# Patient Record
Sex: MALE | Race: WHITE
[De-identification: names, ages, dates, MRNs, and addresses within clinical notes are randomized per-mention and may not be internally consistent; named-entity substitution may affect disease eponyms.]

---

## 2017-11-29 ENCOUNTER — HOSPITAL ENCOUNTER (OUTPATIENT)
Dept: HOSPITAL 17 - HCAT | Age: 62
Discharge: HOME | End: 2017-11-29
Attending: INTERNAL MEDICINE
Payer: COMMERCIAL

## 2017-11-29 VITALS
TEMPERATURE: 98.1 F | OXYGEN SATURATION: 99 % | SYSTOLIC BLOOD PRESSURE: 144 MMHG | DIASTOLIC BLOOD PRESSURE: 87 MMHG | RESPIRATION RATE: 18 BRPM | HEART RATE: 64 BPM

## 2017-11-29 VITALS — BODY MASS INDEX: 30.91 KG/M2 | WEIGHT: 203.93 LBS | HEIGHT: 68 IN

## 2017-11-29 DIAGNOSIS — E66.9: ICD-10-CM

## 2017-11-29 DIAGNOSIS — I10: ICD-10-CM

## 2017-11-29 DIAGNOSIS — E78.00: ICD-10-CM

## 2017-11-29 DIAGNOSIS — I25.110: Primary | ICD-10-CM

## 2017-11-29 LAB
ANION GAP SERPL CALC-SCNC: 3 MEQ/L (ref 5–15)
APTT BLD: 26.6 SEC (ref 24.3–30.1)
BASOPHILS # BLD AUTO: 0.1 TH/MM3 (ref 0–0.2)
BASOPHILS NFR BLD: 1 % (ref 0–2)
BUN SERPL-MCNC: 13 MG/DL (ref 7–18)
CHLORIDE SERPL-SCNC: 105 MEQ/L (ref 98–107)
EOSINOPHIL # BLD: 0.2 TH/MM3 (ref 0–0.4)
EOSINOPHIL NFR BLD: 3.1 % (ref 0–4)
ERYTHROCYTE [DISTWIDTH] IN BLOOD BY AUTOMATED COUNT: 13.2 % (ref 11.6–17.2)
GFR SERPLBLD BASED ON 1.73 SQ M-ARVRAT: 56 ML/MIN (ref 89–?)
HCO3 BLD-SCNC: 30.7 MEQ/L (ref 21–32)
HCT VFR BLD CALC: 44.5 % (ref 39–51)
HEMO FLAGS: (no result)
INR PPP: 1 RATIO
LYMPHOCYTES # BLD AUTO: 1.8 TH/MM3 (ref 1–4.8)
LYMPHOCYTES NFR BLD AUTO: 34.8 % (ref 9–44)
MCH RBC QN AUTO: 31.1 PG (ref 27–34)
MCHC RBC AUTO-ENTMCNC: 34.1 % (ref 32–36)
MCV RBC AUTO: 91.3 FL (ref 80–100)
MONOCYTES NFR BLD: 10.9 % (ref 0–8)
NEUTROPHILS # BLD AUTO: 2.6 TH/MM3 (ref 1.8–7.7)
NEUTROPHILS NFR BLD AUTO: 50.2 % (ref 16–70)
PLATELET # BLD: 164 TH/MM3 (ref 150–450)
POTASSIUM SERPL-SCNC: 3.4 MEQ/L (ref 3.5–5.1)
PROTHROMBIN TIME: 11.4 SEC (ref 9.8–11.6)
RBC # BLD AUTO: 4.88 MIL/MM3 (ref 4.5–5.9)
SODIUM SERPL-SCNC: 139 MEQ/L (ref 136–145)
WBC # BLD AUTO: 5.2 TH/MM3 (ref 4–11)

## 2017-11-29 PROCEDURE — 92928 PRQ TCAT PLMT NTRAC ST 1 LES: CPT

## 2017-11-29 PROCEDURE — 80048 BASIC METABOLIC PNL TOTAL CA: CPT

## 2017-11-29 PROCEDURE — 85730 THROMBOPLASTIN TIME PARTIAL: CPT

## 2017-11-29 PROCEDURE — C1893 INTRO/SHEATH, FIXED,NON-PEEL: HCPCS

## 2017-11-29 PROCEDURE — C1769 GUIDE WIRE: HCPCS

## 2017-11-29 PROCEDURE — 99153 MOD SED SAME PHYS/QHP EA: CPT

## 2017-11-29 PROCEDURE — 99152 MOD SED SAME PHYS/QHP 5/>YRS: CPT

## 2017-11-29 PROCEDURE — C1887 CATHETER, GUIDING: HCPCS

## 2017-11-29 PROCEDURE — 92978 ENDOLUMINL IVUS OCT C 1ST: CPT

## 2017-11-29 PROCEDURE — 85610 PROTHROMBIN TIME: CPT

## 2017-11-29 PROCEDURE — 93454 CORONARY ARTERY ANGIO S&I: CPT

## 2017-11-29 PROCEDURE — C1753 CATH, INTRAVAS ULTRASOUND: HCPCS

## 2017-11-29 PROCEDURE — 85025 COMPLETE CBC W/AUTO DIFF WBC: CPT

## 2017-11-29 PROCEDURE — 93005 ELECTROCARDIOGRAM TRACING: CPT

## 2017-11-29 PROCEDURE — C1874 STENT, COATED/COV W/DEL SYS: HCPCS

## 2017-11-29 PROCEDURE — G0269 OCCLUSIVE DEVICE IN VEIN ART: HCPCS

## 2017-11-29 PROCEDURE — C1760 CLOSURE DEV, VASC: HCPCS

## 2017-11-29 NOTE — MA
cc:

GM TERRELL M.D.

****

 

 

DATE:  11/29/2017

 

PROCEDURE PERFORMED

1. Coronary angiography.

2. Left internal mammary arteriography.

3. Intravascular ultrasound and drug-eluting stent implantation of the left

   internal mammary bypass graft.

 

BRIEF HISTORY

El Belrtán is a 62-year-old man with premature coronary artery disease.

He had bypass surgery at age 42.  By 2004 the vein graft to the right coronary

artery had closed.  He has had three separate stent procedures of the right

coronary artery.  He had two stent procedures of the vein graft to the diagonal

and obtuse marginal branch which was found to be completely closed on a cath in

January 2016.  He has had severe angina and has already been through the first

round of EECP ___________.  After the second round of  EECP he still had class

IV angina and for this reason cardiac catheterization was advised.

 

DESCRIPTION OF PROCEDURE

The patient was brought to the cardiac cath lab in a fasting state.  The right

groin was prepped and draped in sterile fashion.  Using 1% lidocaine for local

anesthesia a 6.5 Nauruan sheath was inserted in the right femoral artery

requiring only a single stick.  Coronary angiography was then completed using a

left 5 Mariia for the left coronary artery and a 3-D RC for the right coronary

artery.  Imaging of the left internal mammary bypass graft was then performed

using an RAY catheter.  He was found to have a severe stenosis in the proximal

portion of the graft with the appearance of a dissection.  Intravascular

ultrasound confirmed a severe stenosis with a flap dissection.  Intravenous

Angiomax was started.  I used a 6 Nauruan RAY guiding catheter to engage the

mammary.  I crossed the lesion with a Choice Floppy wire.  IVUS was performed.

I stented it with a 3.5 x 12 mm Resolute Ti stent at 15 atmospheres.  There

was some plaque at the end of the stent so I placed a second 3.5 x 8 mm

Resolute Stovall extent in overlapping fashion and deployed that also at 15

atmospheres.  Angiography demonstrates a good result.  The patient tolerated

the procedure well.  The guide was removed.  The sheath was removed.  An

Angio-Seal device was deployed with good hemostasis.  There were no

complications.

 

FINDINGS

 

HEMODYNAMICS

Aortic pressure is 112/70 with a mean of 89.

 

CORONARY ANGIOGRAPHY

The left main coronary artery is large and normal-appearing.

 

The left anterior descending artery is totally occluded after a couple of small

septals and a 50% small disease diagonal branch.

 

The circumflex artery has occlusion of the obtuse marginal branch.  The

circumflex is fairly large with irregularities and gives off two posterolateral

branches.  The first posterolateral branch has 20% disease.  The second

posterolateral branch has at least 30% disease.

 

The right coronary is a dominant vessel, has diffuse irregularities but no

significant stenosis is seen.  There may be about 20% proximal narrowing and

30% narrowing before the crux.

 

RESULTS OF INTERVENTION

Following stenting of the proximal left internal mammary graft the stenosis and

dissection has been completely relieved with no residual stenosis.

 

PLAN

The patient will be continued on aspirin and Plavix indefinitely.  Anticipate

discharge home this evening if stable.

 

 

 

                              _________________________________

                               MD RANDA Asif/JAZMIN

D:  11/29/2017/11:13 AM

T:  11/29/2017/11:23 AM

Visit #:  [id number]

Job #:  [id number]

## 2017-11-29 NOTE — CATHPROC
[employer] HIS Report

Study Information

Study Number    Admission           Scheduled Start             Study Start

 

[id number]    Nov 29 2017 7:41AM      11/29/2017 Nov 29 2017 9:34AM

 

Universal Service

 

Cardiac Catheterization

 

Admit Source               Facility Department

 

Other                   [facility] - Cath Lab

 

Physician and Clinical Staff

Initial Ghanshyam Yadav           Circulator     Duglas RN, Tyler

 

                         CircRosemarie Moss BSN

 

                         Recorder      Christal Granados,RT(R) (BS)

 

                         ScrKaterina Mcclelland,RT(R)

 

Procedures Performed

Procedure                     Location (Site)            Vessel Name

 

Coronary Angiograms                 LCA                 Left Coronary

Coronary Angiograms                 RCA                 Right Coronary

Coronary Angiograms                ROSADO-LAD               Left Coronary

Drug Eluting Inflatio               ROSADO-LAD               Left Coronary

IVUS                        Fem Art (right)           Femoral Art

L Heart Cath

Wire insertion                   Fem Art (right)           Femoral Art

Equipment

Time            Description           Size       Mfg Part Number  Used/Scraped

                   TRANSDUCER, VIVI               [id number]

09:34    [employer]                      *                 Used

                   W/STOCKCOCK                   *[id number]

                                            [id number]

10:29    [employer]    WIRE, CHOICE PT 182CM      182CM               Used

                                            *[id number]

                                            [id number]



                                            *[id number]

                                            [phone]



                                            *[id number]

                                            [id number]



                                            *[id number]

                                            [id number]



                                            *[id number]

                                            [id number]

11:00    [employer] ANGIOSEAL, FR6 VIP         FR 6                Used

                                            *[id number]

                                            [id number]

09:34    [employer]    PACK, CCL CUSTOM        *                 Used

                                            *[id number]

                                            [id number]

09:34    [employer]      PEN, SKIN DUAL W/ RULER     *                 Used

                                            *[id number]

                                            [id number]

10:47    [employer]        STENT, 3.5 12MM NADIR      3.5 12MM              Used

                                            *[id number]

                                            [id number]

10:58    [employer]        STENT, 3.5 8MM NADIR       3.5 8MM              Used

                                            *[id number]

                                            [id number]

10:48    [employer]      30 BREANA INDEFLATOR                         Used

                                            *[id number]

                                            [id number]

09:34    [employer]      SHEATH, FR6.5 PRELUDE 11CM   FR 6.5      038ACT      Used

                                            *[id number]

                                            [id number]

09:34    [employer]      WIRE, 3MMJ .035 180CM      180CM               Used

                                            *[id number]

                                            [id number]

09:34    [employer]          MANIFOLD, 4 PORT        *                 Used

                                            *[id number]

09:34    [employer]         OMNIPAQUE, 350 MG, 100ML    100ML      [id number]      Used

                                            [id number]

09:34    [employer]      BLANKET,WARM AIR CCL      *                 Used

                                            *[id number]

                   CATHETER, EAGLE EYE PLATINUM           [id number]

10:33    [employer]                                           Used

                   IMAGING                     *[id number]

 

Equipment Model, Serial, Lot Number and Expiration Data

Description              Model Number      Serial Number   Lot Number       Expiration Date

 

ANGIOSEAL, RHEA VIP                                [id number]        07-

 

STENT, 3.5 12MM NADIR         [id number]                [id number]       07-

 

History: Current Medications

Medication         Dosage/Unit       Route      Frequency  Last Date/Time Taken

 

Statins (any)

 

Beta Blocker

 

ASA

 

EFFIENT

 

 

History: Allergies

Allergy              Reaction

 

iodine

History: Risk Factors

                   Family History of

Hypertension   Dyslipidemia                 Previous MI    Previous Heart Failure

                   Premature CAD

Yes        Yes         No            No         No

Prior Valve

         Prior PCI      Prior CABG

Surgery

No        Yes         Yes

         Cerebrovascular   Peripheral Artery     Chronic Lung

On Dialysis                                    Diabetes

         Disease       Disease          Disease

No        No         No            No         No

 

 

History: Stress Tests

Stress or Imaging Studies Performed

 

No

 

 

History: Other

Current Smoker

 

No

 

Labs

Hgb (g/dl)       Hct (%)       WBC (l/cumm)        Platelets (thousands)

 

11.60-17.00      35.00-51.00     4.00-11.00         150..00

 

15.2          44.5        5.2             164

 

Glucose (mg/dl)    BUN (mg/dl)     Creatinine (mg/dl)    BUN:Creatinine (1:x)

74..00      7.00-18.00     0.50-1.30         10.00-20.00

 

114          13         1.3            10

 

Na (meq/l)       K (meq/l)

 

136..00     3.50-5.10

 

139          3.4

 

INR (PTT:PT)

 

0.90-1.10

 

1

 

CPK-MB (ng/ML)

 

0.50-3.60

 

Not Drawn

 

 

 

 

Medication

Medication Total Dose (Bolus/Oral)

Medication              Total Dosage/Unit

 

1% XYLOCAINE                 20 mL

 

ANGIOMAX BOLUS               13.9 mL

 

MORPHINE                   2 mg

 

NTG (IC)                  150 mcg

 

PLAVIX                   300 mg

 

POTASSIUM CHLORIDE              10 meq/hr

 

VERSED                    3 mg

 

Medications (Bolus/Oral)

Medication           Time Given           Dosage/Unit       Administered By       Reason

 

POTASSIUM CHLORIDE       11/29/2017 9:52:58 AM      10 meq/hr       Tyler Ardon RN

10 meq/hr POTASSIUM CHLORIDE given in lab by Tyler Ardon RN in Left Antecubital via Peripheral IV.

 

                11/29/2017 10:06:48

VERSED                             1 mg         Duglas OWENS, Tyler

                AM

1 mg VERSED given in lab by Duglas OWENS, Tyler in Left Antecubital via Peripheral IV.

 

                11/29/2017 10:08:40

VERSED                             1 mg         Duglas OWENS, Tyler

                AM

1 mg VERSED given in lab by Tyler Ardon RN in Left Antecubital via Peripheral IV.

 

                11/29/2017 10:09:10

1% XYLOCAINE                          20 mL         Duglas OWENS, Tyler

                AM

20 mL 1% XYLOCAINE given in lab by Tyler Ardon RN in Left Antecubital via Subcutaneous.

 

                11/29/2017 10:29:53

ANGIOMAX BOLUS                        13.9 mL         Duglas OWENS, Tyler

                AM

13.9 mL ANGIOMAX BOLUS given in lab by Tyler Ardon RN in Left Antecubital via Peripheral IV.

 

                11/29/2017 10:40:47

VERSED                             1 mg         Duglas OWENS, Tyler

                AM

1 mg VERSED given in lab by Tyler Ardon RN in Left Antecubital via Peripheral IV.

 

                11/29/2017 10:48:22

NTG (IC)                           150 mcg         Ghanshyam Worthington

                AM

150 mcg NTG (IC) given in lab by Ghanshyam Worthington via Intra-coronary.

 

                11/29/2017 11:06:22

PLAVIX                            300 mg         Tyler Ardon RN

                AM

300 mg PLAVIX given in lab by Tyler Ardon RN via Oral.

 

                11/29/2017 11:11:54

MORPHINE                            2 mg         Duglas OWENS, Tyler

                AM

2 mg MORPHINE given in lab by Tyler Ardon RN in Left Antecubital via Peripheral IV.

 

Medication (Drip)

Medication           Time Given           Dosage/Unit      Concentration/Unit  Diluent (ml)  Solution


                11/29/2017 10:33:52

ANGIOMAX DRIP                        1.755 mg/kg/hr         250 mg     50       NaCl .9

                AM

1.755 mg/kg/hr ANGIOMAX DRIP given in lab by Tyler Ardon RN in Left Antecubital via Peripheral IV. P
ump/Drip Flow = 32.4 ml/hr using NaCl .9 with

a concentration of 250 mg in 50 ml.

ANGIOMAX DRIP         11/29/2017 11:02:07

                                0 units/hr                 0

STOPPED            AM

0 units/hr ANGIOMAX DRIP STOPPED given by Tyler Ardon RN. Pump/Drip Flow = 0 ml/hr using [Solution N
hao].

 

IV Solutions          11/29/2017 9:45:33 AM      0 mL (IV)                 500       NaCl .9

Patient arrived on IV Solutions in Left Antecubital via Peripheral IV. Pump/Drip Flow = 30 ml/hr usin
g NaCl .9.

Initial Case Assessment

Cardiovascular

HR           Rhythm          NIBP          Chest Pain

 

86           reg            133/86         0

 

Edema Present      Skin color           Skin

 

None           Normal             Warm Dry

 

Circulatory - Right Pulses

Dorsalis Pedis     Femoral

 

2            2

 

Scale (0,1,2,3,4,d)

 

Circulatory - Left Pulses

Dorsalis Pedis     Femoral

 

2            2

 

Scale (0,1,2,3,4,d)

 

Circulatory - Lower Extremities

Color Lower Right    Color Lower Left

 

 

Normal         Normal

 

Neurological State

            Oriented to time-place-

Alert                      Moves all extremities

            person

 

Respiration - General

Respiration Rate

            SpO2 (%)

(B/min)

15           98

 

Chronological Log

Time    Study Chronological Log

 

9:42:07   Patient arrived via Bed.

 

9:42:11   Patient Name, D.O.B, / Armband Verified By R.N.

 

9:42:13   Consent signed by the physician and the patient and verified by the Cath Lab staff.

 

9:43:23   Pt was premedicated for iodine allergy in DOCU prior to transport to cath lab

 

9:45:17   Pre-op and post- op instructions given; patient acknowledges understanding of instructions.


 

9:45:18   Verbal Stimulation=2 Physical Stimulation=2 Airway=2 Respiration=2 TOTAL=8. (0=absent, 1=li
mited, 2=present)

 

9:45:19   Presedation assessment performed by Cath Lab RN.

 

9:45:27   Patient has been NPO for More than 6Hrs.

 

9:45:28   Skin Breakdown none per pt

 

9:45:28   Patient Warmer Placed on the Table.

 

9:45:32   Marta Prominences Protected

 

9:45:33   A # 20 IV was noted in the Antecubital (left). Grade = 0

 

9:45:33   Patient arrived on IV Solutions in Left Antecubital via Peripheral IV. Pump/Drip Flow = 30 
ml/hr using NaCl .9.

 

9:45:34   History and physical on the chart or being dictated.

      Assessment: Initial Case, HR=86 BPM, Rhythm=reg, RHDF=195/86 mmhg, Chest Pain=0, Edema=None, Co
preethi=Normal,

      Skin = Warm, Dry

      Right Pulses: Terrence Ped=2, Femoral=2

      Left Pulses: Terrence Ped=2, Femoral=2

9:45:36

      Lower Right Extremities: Color=Normal

      Lower Left Extremities: Color=Normal

      Neurological: State=Alert, Ox3, LACY

      Respiration: Resp=15 B/min, SpO2=98 %

      Vitals capture started with the following parameters, Patient=Adult, Interval=5 min, Initial Pr
kvmcqp=522 mmHg,

9:49:19

      Deflation Rate=5 mmHg, Cuff placed on Left Arm

9:50:03  HR=82 bpm, DBZZ=222/86 mmhg, SpO2=99.0 %, Resp=16 B/min, Pain=0, Glendy=10, Bosch=2

 

9:52:42  Bilateral groins prepped with 2% chlorhexidine, and draped after a 3 minute waiting time.

 

9:52:58  10 meq/hr POTASSIUM CHLORIDE given in lab by Tyler Ardon RN in Left Antecubital via Periphe
ral IV.

 

9:54:58  HR=88 bpm, NZXZ=069/84 mmhg, SpO2=99.0 %, Resp=11 B/min, Pain=0, Glendy=10, Bosch=2

 

9:56:33  Reference ECG taken

 

9:56:54  Pressure channel 1 zeroed.

 

9:57:07  MD paged

 

10:00:34  MD arrived

 

10:00:38  HR=73 bpm, MFSQ=285/51 mmhg, SpO2=94.0 %, Resp=12 B/min, Pain=0, Glendy=10, Bosch=2

      Time Out. Correct patient, correct procedure, correct physician, power injector not loaded with
 contrast with surgical

10:05:04

      team present. Time Out Concurred by MD and individual staff in procedure.

10:05:15  Case Start

 

10:05:19  HR=74 bpm, SWIM=443/89 mmhg, SpO2=97.0 %, Resp=17 B/min, Pain=0, Glendy=10, Bosch=2

 

10:06:48  1 mg VERSED given in lab by Tyler Ardon RN in Left Antecubital via Peripheral IV.

 

10:08:40  1 mg VERSED given in lab by Tyler Ardon RN in Left Antecubital via Peripheral IV.

 

10:09:10  20 mL 1% XYLOCAINE given in lab by Tyler Ardon RN in Left Antecubital via Subcutaneous.

 

10:09:57  HR=75 bpm, NQAK=667/80 mmhg, SpO2=95.0 %, Resp=15 B/min, Pain=0, Glendy=10, Bosch=2

 

10:10:26  Access site was Right Femoral Artery.

 

10:10:45  A SHEATH, FR6.5 PRELUDE 11CM FR 6.5 was advanced into the Fem Art (right) using the Percuta
neous technique.

      A JL 5.0 INFINITI CATHETER FR 6 was advanced over a wire. OMNIPAQUE, 350 MG, 100ML 100ML was us
ed for

10:11:39

      injections.

      Recorded Pressure: Ao, HR=73, Condition=Condition 1

10:13:04

      (Aorta) Ao 112/70/89

10:13:43  The  LCA was injected and visualized at various angles. OMNIPAQUE, 350 MG, 100ML 100ML used
.

 

10:14:58  HR=74 bpm, RFVY=055/79 mmhg, SpO2=96.0 %, Resp=15 B/min, Pain=0, Glendy=10, Bosch=2

 

10:15:08  Catheter was removed

      A 3DRC INFINITI CATHETER FR 6 was advanced over a wire. OMNIPAQUE, 350 MG, 100ML 100ML was used
 for

10:15:10

      injections.

10:17:14  The  RCA was injected and visualized at various angles. OMNIPAQUE, 350 MG, 100ML 100ML used
.

 

10:19:13  Catheter was removed

      A RAY INFINITI CATHETER FR 6 was advanced over a wire. OMNIPAQUE, 350 MG, 100ML 100ML was used 
for

10:19:14

      injections.

10:19:55  HR=77 bpm, OHEK=488/78 mmhg, SpO2=94.0 %, Resp=18 B/min, Pain=0, Glendy=10, Bosch=2

 

10:22:40  The LIMA-LAD was injected and visualized at various angles. OMNIPAQUE, 350 MG, 100ML 100ML 
used.

 

10:25:00  HR=76 bpm, QLLZ=001/78 mmhg, SpO2=95.0 %, Resp=12 B/min, Pain=0, Glendy=10, Bosch=2

 

10:26:37  Catheter was removed

 

10:29:53  HR=75 bpm, OWDH=825/90 mmhg, SpO2=89.0 %, Resp=11 B/min, Pain=0, Lgendy=10, Bosch=2

10:29:53  13.9 mL ANGIOMAX BOLUS given in lab by Tyler Ardon RN in Left Antecubital via Peripheral I
V.

 

10:30:43  A RAY GUIDE CATHETER FR 6 was advanced over a wire. OMNIPAQUE, 350 MG, 100ML 100ML was used
 for injections.

      1.755 mg/kg/hr ANGIOMAX DRIP given in lab by Tyler Ardon RN in Left Antecubital via Peripheral
 IV. Pump/Drip Flow =

10:33:52

      32.4 ml/hr using NaCl .9 with a concentration of 250 mg in 50 ml.

10:34:24  A WIRE, 3MMJ .035 180CM 180CM was inserted via Fem Art (right).

 

10:35:00  HR=72 bpm, JODA=552/76 mmhg, SpO2=94.0 %, Resp=21 B/min, Pain=0, Glendy=10, Bosch=2

 

10:37:29  A WIRE, CHOICE PT 182CM 182CM was inserted via Fem Art (right).

 

10:39:11  An CATHETER, EAGLE EYE PLATINUM IMAGING was advanced through the lesion.       Images saved
 onto IVUS hard drive

 

10:39:57  HR=71 bpm, JNHJ=885/79 mmhg, SpO2=92.0 %, Resp=22 B/min, Pain=0, Glendy=10, Bosch=2

 

10:40:47  1 mg VERSED given in lab by Tyler Ardon RN in Left Antecubital via Peripheral IV.

 

10:44:04  IVUS catheter removed

 

10:45:01  HR=74 bpm, YVMD=248/76 mmhg, SpO2=90.0 %, Resp=20 B/min, Pain=0, Glendy=10, Bosch=2

      A STENT, 3.5 12MM NADIR 3.5 12MM was advanced through a RAY GUIDE CATHETER FR 6 over a WIRE, CHO
ICE PT

10:46:08

      182CM 182CM.

      A STENT, 3.5 12MM NADIR 3.5 12MM was deployed using a 30 BREANA INDEFLATOR at 16 atmospheres for 17
 seconds in

10:47:32

      the LIMA-LAD.

10:48:22  150 mcg NTG (IC) given in lab by Ghanshyam Worthington via Intra-coronary.

 

10:49:21  Delivery device removed

 

10:49:58  HR=77 bpm, IPPF=273/78 mmhg, SpO2=88.0 %, Resp=25 B/min, Pain=0, Glendy=10, Bosch=2

 

10:50:33  An CATHETER, EAGLE EYE PLATINUM IMAGING was advanced through the lesion.       Images saved
 onto IVUS hard drive

 

10:54:49  IVUS catheter removed

 

10:55:01  HR=76 bpm, ZOIF=481/78 mmhg, SpO2=91.0 %, Resp=22 B/min, Pain=0, Glendy=10, Bosch=2

      A STENT, 3.5 8MM NADIR 3.5 8MM was advanced through a RAY GUIDE CATHETER FR 6 over a WIRE, CHOIC
E PT

10:55:28

      182CM 182CM.

      A STENT, 3.5 8MM NADIR 3.5 8MM was deployed using a 30 BREANA INDEFLATOR at 15 atmospheres for 20 s
econds in the

10:56:08

      LIMA-LAD.

10:57:47  Delivery device removed

 

10:57:53  Catheter was removed

 

10:57:57  Wire removed

 

10:59:15  An injection in the Fem Art (right) was made through the SHEATH, FR6.5 PRELUDE 11CM FR 6.5.


 

10:59:58  HR=83 bpm, NOJL=824/81 mmhg, SpO2=92.0 %, Resp=24 B/min, Pain=0, Glendy=10, Bosch=2

 

11:02:07  0 units/hr ANGIOMAX DRIP STOPPED given by Tyler Ardon RN. Pump/Drip Flow = 0 ml/hr using [
Solution Name].

 

11:04:33  Case End

 

11:05:01  HR=71 bpm, ZMNY=460/65 mmhg, SpO2=92.0 %, Resp=20 B/min, Pain=0, Glendy=10, Bosch=2

 

11:06:21  Catheter(s) removed without difficulty

 

11:06:22  300 mg PLAVIX given in lab by Tyler Ardon RN via Oral.

 

11:06:25  Sterile dressing applied to site

 

11:06:26  No case complications noted.

 

11:06:32  A Left Heart Cath was performed.

 

11:06:56  DOCU called. Spoke to Judah.

 

11:09:58  HR=69 bpm, JPRP=086/75 mmhg, SpO2=91.0 %, Resp=14 B/min, Pain=0, Glendy=10, Bosch=2

 

11:11:54  2 mg MORPHINE given in lab by Tyler Ardon RN in Left Antecubital via Peripheral IV.

 

11:12:53  Vitals capture stopped.

11:13:46   Patient moved to [facility]

 

 

 

 

End Study - Contrast Media Used In Study

Contrast       Total Opened (mL)  Total Used (mL)     Total Wasted (mL)

 

Omnipaque       100         100           0

 

End Study - Maximum Contrast Load

Max Contrast Load (mL)

 

355.1

 

End Study - Radiation Exposure

Fluoro Time

(minutes)

13.5

 

 

End Study - Patient Disposition

Complications     Transferred To       Interventional Outcome

 

No          [facility]      successful

## 2017-11-29 NOTE — EKG
Date Performed: 11/29/2017       Time Performed: 08:24:12

 

PTAGE:      62 years

 

EKG:      Sinus rhythm 

 

 Leftward axis Right bundle branch block Lateral ST-T changes are nonspecific Abnormal ECG Compared t
o prior electrocardiogram, Right bundle branch block is present. 

 

 PREVIOUS TRACING            : 08/04/2010 09.13

 

DOCTOR:   Oral Forbes  Interpretating Date/Time  11/29/2017 13:28:02

## 2018-05-16 ENCOUNTER — HOSPITAL ENCOUNTER (OUTPATIENT)
Dept: HOSPITAL 17 - HCAT | Age: 63
Discharge: HOME | End: 2018-05-16
Attending: INTERNAL MEDICINE
Payer: COMMERCIAL

## 2018-05-16 VITALS
DIASTOLIC BLOOD PRESSURE: 88 MMHG | SYSTOLIC BLOOD PRESSURE: 167 MMHG | HEART RATE: 64 BPM | OXYGEN SATURATION: 98 % | RESPIRATION RATE: 18 BRPM | TEMPERATURE: 97.8 F

## 2018-05-16 DIAGNOSIS — Z79.82: ICD-10-CM

## 2018-05-16 DIAGNOSIS — E78.00: ICD-10-CM

## 2018-05-16 DIAGNOSIS — Z79.01: ICD-10-CM

## 2018-05-16 DIAGNOSIS — I25.10: Primary | ICD-10-CM

## 2018-05-16 DIAGNOSIS — I45.10: ICD-10-CM

## 2018-05-16 DIAGNOSIS — I10: ICD-10-CM

## 2018-05-16 LAB
BASOPHILS # BLD AUTO: 0.1 TH/MM3 (ref 0–0.2)
BASOPHILS NFR BLD: 1.3 % (ref 0–2)
BUN SERPL-MCNC: 14 MG/DL (ref 7–18)
CALCIUM SERPL-MCNC: 8.5 MG/DL (ref 8.5–10.1)
CHLORIDE SERPL-SCNC: 107 MEQ/L (ref 98–107)
CREAT SERPL-MCNC: 1.23 MG/DL (ref 0.6–1.3)
EOSINOPHIL # BLD: 0.2 TH/MM3 (ref 0–0.4)
EOSINOPHIL NFR BLD: 3.8 % (ref 0–4)
ERYTHROCYTE [DISTWIDTH] IN BLOOD BY AUTOMATED COUNT: 13.6 % (ref 11.6–17.2)
GFR SERPLBLD BASED ON 1.73 SQ M-ARVRAT: 59 ML/MIN (ref 89–?)
GLUCOSE SERPL-MCNC: 106 MG/DL (ref 74–106)
HCO3 BLD-SCNC: 29.4 MEQ/L (ref 21–32)
HCT VFR BLD CALC: 43.3 % (ref 39–51)
HGB BLD-MCNC: 14.5 GM/DL (ref 13–17)
LYMPHOCYTES # BLD AUTO: 1.6 TH/MM3 (ref 1–4.8)
LYMPHOCYTES NFR BLD AUTO: 35.1 % (ref 9–44)
MCH RBC QN AUTO: 30.1 PG (ref 27–34)
MCHC RBC AUTO-ENTMCNC: 33.4 % (ref 32–36)
MCV RBC AUTO: 90.2 FL (ref 80–100)
MONOCYTE #: 0.5 TH/MM3 (ref 0–0.9)
MONOCYTES NFR BLD: 10 % (ref 0–8)
NEUTROPHILS # BLD AUTO: 2.3 TH/MM3 (ref 1.8–7.7)
NEUTROPHILS NFR BLD AUTO: 49.8 % (ref 16–70)
PLATELET # BLD: 165 TH/MM3 (ref 150–450)
PMV BLD AUTO: 8.1 FL (ref 7–11)
RBC # BLD AUTO: 4.81 MIL/MM3 (ref 4.5–5.9)
SODIUM SERPL-SCNC: 144 MEQ/L (ref 136–145)
WBC # BLD AUTO: 4.6 TH/MM3 (ref 4–11)

## 2018-05-16 PROCEDURE — 99152 MOD SED SAME PHYS/QHP 5/>YRS: CPT

## 2018-05-16 PROCEDURE — 99153 MOD SED SAME PHYS/QHP EA: CPT

## 2018-05-16 PROCEDURE — 93005 ELECTROCARDIOGRAM TRACING: CPT

## 2018-05-16 PROCEDURE — 85025 COMPLETE CBC W/AUTO DIFF WBC: CPT

## 2018-05-16 PROCEDURE — 80048 BASIC METABOLIC PNL TOTAL CA: CPT

## 2018-05-16 PROCEDURE — 85730 THROMBOPLASTIN TIME PARTIAL: CPT

## 2018-05-16 PROCEDURE — C1769 GUIDE WIRE: HCPCS

## 2018-05-16 PROCEDURE — 93454 CORONARY ARTERY ANGIO S&I: CPT

## 2018-05-16 PROCEDURE — C1893 INTRO/SHEATH, FIXED,NON-PEEL: HCPCS

## 2018-05-16 NOTE — MA
cc:

Ghanshyam Worthington MD, Matthew MD

****

 

 

DATE:

05/16/2018

 

PROCEDURES PERFORMED:

Coronary angiography, left internal mammary bypass arteriography.

 

BRIEF HISTORY:

El Beltrán is a 63-year-old man who has had multiple 

revascularization procedures including bypass surgery and multiple 

stents.  His last procedure, he had a ruptured plaque and dissection 

in his proximal left main coronary artery that required stenting.  He 

continues to have severe angina and underwent a recent nuclear stress 

test that was very abnormal and for this reason, cardiac 

catheterization was advised.

 

DESCRIPTION OF PROCEDURE:

The patient was brought to the cardiac catheterization lab in a 

fasting state.  Using 1% lidocaine for local anesthesia, a 6-1/2 

Romansh sheath was easily inserted into the right femoral artery.  

Coronary angiography was then completed using a left 5-Mariia for the

left coronary artery and a 3DRC for the right coronary artery.  

Angiography of the internal mammary bypass was performed using an RAY 

catheter.  I studied these films and elected medical management.  The 

sheath is being pulled manually.

 

FINDINGS:

 

HEMODYNAMICS:

1.  Aortic pressure is 166/88 with a mean of 121.

2.  Coronary angiography:  The left main coronary artery was large and

normal.  It bifurcates into the LAD and circumflex vessels.  The LAD 

is totally occluded after a small septal and a small diagonal.  The 

diagonal branch is less than 2 mm and diffusely diseased with 90% 

proximal stenosis.  I thought this was too small to really intervene 

upon safely.  The circumflex artery has occlusion of the major obtuse 

marginal branch.  The distal circumflex after a small posterolateral 

branch has about 30% disease.  The posterolateral branch has about 25%

disease.  The right coronary artery is dominant.  The mid vessel has 

about 25% disease in between the previous stents.

3.  Bypass grafts:  The left internal mammary graft to the mid LAD is 

widely patent.  The stents in the proximal portion of the mammary are 

widely patent.  There may be about 10 to 20% stenosis before the 

stents.  The remaining vein grafts he has are noted to be totally 

occluded.

 

CONCLUSIONS:

1.  Stable appearance of the previous left internal mammary artery 

stenting to the LAD.

2.  Stable right coronary artery from previous procedures.

3.  Mild disease of the distal circumflex best treated medically.

4.  Severe disease of the diagonal branch that is less than 2 mm in 

size.  We will continue medical management.

 

 

 

__________________________________

MD RANDA Asif/SHELBY

D: 05/16/2018, 11:42 AM

T: 05/16/2018, 12:03 PM

Visit #: [id number]

Job #: [id number]

## 2018-05-16 NOTE — CATHPROC
[employer] HIS Report

Study Information

Study Number     Admission           Scheduled Start               Study Start

 

[id number]     May 16 2018 6:41AM      05/16/2018                  May 16 2018 10:40AM

 

Universal Service

 

Cardiac Catheterization

 

Admit Source                Facility Department

 

Emergency department            [facility] - Cath Lab

 

Physician and Clinical Staff

Initial Ghanshyam Yadav          Circulator       Katerina Randall,RT(R)

 

                          Recorder        Estefanía Mcdowell ,RT(R)

 

                          Scrub          Lizbeth Vazquez RN

 

Procedures Performed

Procedure                     Location (Site)             Vessel Name

 

Coronary Angiograms                 LCA                  Left Coronary

Coronary Angiograms                 RCA                  Right Coronary

Coronary Angiograms                 ROSADO-LAD                Left Coronary

L Heart Cath

 

 

 

Equipment

Time           Description            Size           Mfg Part Number  Used/Scraped

                   TRANSDUCER, TRUWAVE                    [id number]

10:53    SEGOVIA LAWSON                      *                     Used

                   W/STOCKCOCK                        *[id number]

                                                [phone]



                                                *[id number]

                                                [phone]



                                                *[id number]

                                                [phone]



                                                *[id number]

                                                [phone]



                                                *[id number]

                                                [id number]

10:53    [employer]   PACK, CCL CUSTOM         *                     Used

                                                *[id number]

                                                [id number]

10:53    [employer]      PEN, SKIN DUAL W/ RULER      *                     Used

                                                *[id number]

                                                [id number]

10:53    [employer]      SHEATH, FR6.5 PRELUDE 11CM    FR 6.5          038ACT      Used

                                                *[id number]

                                                [id number]

10:53    [employer]      WIRE, 3MMJ .035 180CM       180CM                   Used

                                                *[id number]

                                                [id number]

10:53    [employer]          MANIFOLD, 4 PORT         *                     Used

                                                *[id number]

10:53    [employer]         OMNIPAQUE, 350 MG, 150ML     150ML          [id number]      Used

                                                [id number]

10:53    [facility]      BLANKET,WARM AIR CCL       *                     Used

                                                *[id number]

History: Current Medications

Medication         Dosage/Unit       Route         Frequency      Last Date/Time Taken

 

Statins (any)

 

Beta Blocker

 

NORVASC

 

ASA

 

PLAVIX

 

NTG Patch

 

 

History: Allergies

Allergy               Reaction

 

No Known Allergies

 

iodine

 

 

History: Risk Factors

Hypertension    Dyslipidemia      Previous MI     Previous Heart Failure

 

Yes         Yes           No         No

Prior Valve

          Prior PCI        Prior PCIDate      Prior CABG     Prior CABGDate

Surgery

No         Yes           11/29/2017        Yes         09/01/1997

          Cerebrovascular     Peripheral Artery    Chronic Lung

On Dialysis                                       Diabetes

          Disease         Disease         Disease

No         No           No            No         No

 

 

History: Stress Tests

Stress or Imaging Studies Performed

 

Yes

Standard Exercise Stress

Test

No

 

Stress Echo

 

No

 

Stress Test SPECT        Stress Test SPECT Result     Stress Test SPECT Ischemia Risk/Extent

 

Yes               Positive             High

 

Stress Test CMR

 

No

 

Cardiac CTA           Coronary Calcium Score

 

No               No

 

 

History: Other

Current Smoker

 

No

 

Labs

Hgb (g/dl)        Hct (%)          WBC (l/cumm)         Platelets (thousands)

 

11.60-17.00       35.00-51.00        4.00-11.00          150..00

 

14.5           43.3           4.6             165

 

Glucose (mg/dl)     BUN (mg/dl)        Creatinine (mg/dl)    BUN:Creatinine (1:x)

74..00       7.00-18.00        0.50-1.30         10.00-20.00

 

106           14            1.2            11.7

 

Na (meq/l)        K (meq/l)

 

136..00      3.50-5.10

 

144           3.7

 

CPK-MB (ng/ML)

 

0.50-3.60

 

Not Drawn

 

 

 

 

Medication

Medication Total Dose (Bolus/Oral)

Medication              Total Dosage/Unit

 

1% XYLOCAINE                 10 mL

 

BENADRYL                   50 mg

 

SOLU-MEDROL                 50 mg

 

VERSED                    2 mg

 

Medications (Bolus/Oral)

Medication           Time Given          Dosage/Unit       Administered By      Reason

 

BENADRYL            5/16/2018 11:00:11 AM      50 mg         Hesher, Lizbeth

50 mg BENADRYL given in lab by Lizbeth Vazquez, RN via Peripheral IV. Ordered by Ghanshyam Worthington.

 

SOLU-MEDROL          5/16/2018 11:01:18 AM      50 mg         Hesher, Lizbeth

50 mg SOLU-MEDROL given in lab by Lizbeth Vazquez, RN via Peripheral IV. Ordered by Ghanshyam Worthington.

 

VERSED             5/16/2018 11:13:35 AM      2 mg         Hesher, Lizbeth

2 mg VERSED given in lab by Lizbeth Vazquez, RN in Left Antecubital via Peripheral IV. Ordered by Ghanshyam Ramirez.

 

1% XYLOCAINE          5/16/2018 11:14:23 AM      10 mL         Ghanshyam Worthington

10 mL 1% XYLOCAINE given in lab by Ghanshyam Worthington in Right Groin via Subcutaneous. Ordered by Ghanshyam Worthington.

 

Medication (Drip)

Medication           Time Given          Dosage/Unit       Concentration/Unit  Diluent (ml)  Solution


 

IV Solutions          5/16/2018 10:49:08 AM      50 mL (IV)                         NaCl .9

Patient arrived on IV Solutions in Left Antecubital via Peripheral IV. Pump/Drip Flow using NaCl .9.

Initial Case Assessment

Cardiovascular

HR          Rhythm         NIBP          Chest Pain

 

70          sr           159/107         0

 

Edema Present     Skin color           Skin

 

None         Normal             Warm Dry

 

Circulatory - Right Pulses

Dorsalis Pedis    Femoral

 

2           2

 

Scale (0,1,2,3,4,d)

 

Circulatory - Left Pulses

Dorsalis Pedis    Femoral

 

2           2

 

Scale (0,1,2,3,4,d)

 

Neurological State

           Oriented to time-place-

Alert                     Moves all extremities

           person

 

Respiration - General

Respiration Rate

           SpO2 (%)

(B/min)

28          99

Final Case Assessment

Cardiovascular

HR           Rhythm          NIBP           Chest Pain

 

70           sr            159/107         0

 

Edema Present      Skin color           Skin

 

None           Normal             Warm Dry

 

Circulatory - Right Pulses

Dorsalis Pedis     Femoral

 

2            2

 

Scale (0,1,2,3,4,d)

 

Circulatory - Left Pulses

Dorsalis Pedis     Femoral

 

2            2

 

Scale (0,1,2,3,4,d)

 

Neurological State

            Oriented to time-place-

Alert                      Moves all extremities

            person

 

Respiration - General

Respiration Rate

            SpO2 (%)

(B/min)

28           99

 

Chronological Log

Time    Study Chronological Log

 

10:44:57  Patient arrived via Bed.

 

10:44:58  Patient Name, D.O.B, / Armband Verified By R.N.

 

10:44:59  Consent signed by the physician and the patient and verified by the Cath Lab staff.

 

10:45:00  Pre-op and post- op instructions given; patient acknowledges understanding of instructions.


      Vitals capture started with the following parameters, Patient=Adult, Interval=5 min, Initial Pr
yufflu=294 mmHg,

10:48:29

      Deflation Rate=5 mmHg, Cuff placed on Left Arm

10:48:58  Verbal Stimulation=2 Physical Stimulation=2 Airway=2 Respiration=2 TOTAL=8. (0=absent, 1=li
mited, 2=present)

 

10:49:01  Patient has been NPO for More than 6Hrs.

 

10:49:02  Skin Breakdown- none per patient

 

10:49:04  Patient Warmer Placed on the Table.

 

10:49:05  HR=74 bpm, DITQ=545/107 mmhg, SpO2=99.0 %, Resp=11 B/min, Pain=0, Glendy=10, Bosch=2

 

10:49:07  Marta Prominences Protected

 

10:49:08  A # 20 IV was noted in the Antecubital (left). Grade = 0

 

10:49:08  Patient arrived on IV Solutions in Left Antecubital via Peripheral IV. Pump/Drip Flow using
 NaCl .9.

 

10:49:09  History and physical on the chart or being dictated.

      Assessment: Initial Case, HR=70 BPM, Rhythm=sr, OBXM=446/107 mmhg, Chest Pain=0, Edema=None, Co
preethi=Normal,

      Skin = Warm, Dry

      Right Pulses: Terrence Ped=2, Femoral=2

10:49:10

      Left Pulses: Terrence Ped=2, Femoral=2

      Neurological: State=Alert, Ox3, LACY

      Respiration: Resp=28 B/min, SpO2=99 %

10:54:30  Bilateral groins prepped with 2% chlorhexidine, and draped after a 3 minute waiting time.

 

10:54:56  HR=71 bpm, WJNA=362/115 mmhg, JbK4=581.0 %, Resp=13 B/min, Pain=0, Glendy=10, Bosch=2

 

10:55:28  Reference ECG taken

 

10:55:42  Reference ECG taken

 

10:58:13  Pressure channel 1 zeroed.

 

10:58:35  MD paged

 

10:59:20  HR=68 bpm, FAXG=147/57 mmhg, SpO2=98.0 %, Resp=22 B/min, Pain=0, Glendy=10, Bosch=2

 

11:00:11  50 mg BENADRYL given in lab by Lizbeth Vazquez, RN via Peripheral IV. Ordered by Guido Worthington

 

11:01:18  50 mg SOLU-MEDROL given in lab by Lizbeth Vazquez RN via Peripheral IV. Ordered by Ghanshyam Worthington.

 

11:02:48  MD arrived.

 

11:04:37  HR=68 bpm, SLLM=947/97 mmhg, SpO2=99.0 %, Resp=13 B/min, Pain=0, Glendy=10, Bosch=2

 

11:09:12  HR=66 bpm, CCER=907/105 mmhg, SpO2=96.0 %, Resp=25 B/min, Pain=0, Glendy=10, Bosch=2

      Time Out. Correct patient, correct procedure, correct physician, power injector not loaded with
 contrast with surgical

11:12:13

      team present. Time Out Concurred by MD and individual staff in procedure.

11:12:32  Case Start

 

11:13:35  2 mg VERSED given in lab by Lizbeth Vazquez, RN in Left Antecubital via Peripheral IV. Order
ed by Ghanshyam Worthington.

 

11:14:11  HR=71 bpm, REHQ=702/102 mmhg, BcB1=583.0 %, Resp=15 B/min, Pain=0, Glendy=10, Bosch=2

 

11:14:23  10 mL 1% XYLOCAINE given in lab by Ghanshyam Worthington in Right Groin via Subcutaneous. Ordered b
y Ghanshyam Worthington.

 

11:15:36  Access site was Right Femoral Artery.

 

11:15:48  A SHEATH, FR6.5 PRELUDE 11CM FR 6.5 was advanced into the Fem Art (right) using the Percuta
neous technique.

      A JL 5.0 INFINITI CATHETER FR 6 was advanced over a wire. OMNIPAQUE, 350 MG, 150ML 150ML was us
ed for

11:16:44

      injections.

      Recorded Pressure: Ao, HR=68, Condition=Condition 1

11:18:04

      (Aorta) Ao 166/88/121

11:18:15  The  LCA was injected and visualized at various angles. OMNIPAQUE, 350 MG, 150ML 150ML used
.

 

11:19:10  HR=69 bpm, VAHZ=824/91 mmhg, SpO2=95.0 %, Resp=23 B/min, Pain=0, Glendy=10, Bosch=2

 

11:19:33  Catheter was removed

      A 3DRC INFINITI CATHETER FR 6 was advanced over a wire. OMNIPAQUE, 350 MG, 150ML 150ML was used
 for

11:20:40

      injections.

11:21:08  The  RCA was injected and visualized at various angles. OMNIPAQUE, 350 MG, 150ML 150ML used
.

 

11:21:59  Catheter was removed

      A RAY INFINITI CATHETER FR 6 was advanced over a wire. OMNIPAQUE, 350 MG, 150ML 150ML was used 
for

11:23:13

      injections.

11:24:13  HR=73 bpm, YKAB=107/102 mmhg, SpO2=98.0 %, Resp=10 B/min, Pain=0, Glendy=10, Bosch=2

 

11:26:12  The LIMA-LAD was injected and visualized at various angles. OMNIPAQUE, 350 MG, 150ML 150ML 
used.

 

11:27:15  Catheter was removed

 

11:29:14  HR=74 bpm, SLFO=067/102 mmhg, SpO2=99.0 %, Resp=10 B/min, Pain=0, Glendy=10, Bosch=2

 

11:34:13  HR=70 bpm, QWAP=059/102 mmhg, SpO2=99.0 %, Resp=13 B/min, Pain=0, Glendy=10, Bosch=2

 

11:35:41  An injection in the Fem Art (right) was made through the SHEATH, FR6.5 PRELUDE 11CM FR 6.5.


 

11:36:26  Case End

       Assessment: Final Case, HR=70 BPM, Rhythm=sr, NFIY=992/107 mmhg, Chest Pain=0, Edema=None, Col
or=Normal,

       Skin = Warm, Dry

       Right Pulses: Terrence Ped=2, Femoral=2

11:39:42

       Left Pulses: Terrence Ped=2, Femoral=2

       Neurological: State=Alert, Ox3, LACY

       Respiration: Resp=28 B/min, SpO2=99 %

11:39:45   HR=80 bpm, QJXU=412/84 mmhg, SpO2=97.0 %, Resp=7 B/min, Pain=0, Glendy=10, Bosch=2

 

11:40:10   Catheter(s) removed without difficulty

 

11:40:15   Sheath removed; pressure applied to access site.

 

11:40:22   Sterile dressing applied to site

 

11:40:23   No case complications noted.

 

11:40:24   Holding Area notified of successful intervention.

 

11:40:34   A Left Heart Cath was performed.

 

11:44:44   HR=66 bpm, ZIZW=331/100 mmhg, SpO2=94.0 %, Resp=18 B/min, Pain=0, Glendy=10, Bosch=2

 

11:49:10   HR=65 bpm, EZSX=359/103 mmhg, SpO2=97.0 %, Resp=20 B/min, Pain=0, Glendy=10, Bosch=2

 

11:54:11   HR=66 bpm, YWPB=615/99 mmhg, SpO2=95.0 %, Resp=13 B/min, Pain=0, Glendy=10, Bosch=2

 

11:59:58   Patient moved to stretcher

 

 

 

 

End Study - Contrast Media Used In Study

Contrast        Total Opened (mL)     Total Used (mL)     Total Wasted (mL)

 

Omnipaque       90            90            0

 

End Study - Maximum Contrast Load

Max Contrast Load (mL)

 

368.4

 

End Study - Radiation Exposure

Fluoro Time

(minutes)

4.2

 

End Study - Sheaths

Sheaths Pulled By                Sheath Hold Time (min)

 

Olivia Randalley                   16

 

 

End Study - Patient Disposition

Complications     Transferred To          Interventional Outcome

 

No           Telemetry Bed          No attempt made

## 2018-05-16 NOTE — EKG
Date Performed: 05/16/2018       Time Performed: 07:42:18

 

PTAGE:      63 years

 

EKG:      Sinus bradycardia. Leftward axis Extensive ST-T changes are nonspecific Low QRS voltages in
 precordial leads Borderline ECG

 

PREVIOUS TRACING       : 11/29/2017 08.24 Compared to previous tracing,  RBBB is no longer present

 

DOCTOR:   Harvey Mendez  Interpretating Date/Time  05/16/2018 22:15:01